# Patient Record
Sex: FEMALE | Race: ASIAN | NOT HISPANIC OR LATINO | Employment: FULL TIME | RURAL
[De-identification: names, ages, dates, MRNs, and addresses within clinical notes are randomized per-mention and may not be internally consistent; named-entity substitution may affect disease eponyms.]

---

## 2021-04-07 ENCOUNTER — HOSPITAL ENCOUNTER (EMERGENCY)
Facility: HOSPITAL | Age: 46
Discharge: HOME | End: 2021-04-07

## 2021-04-07 VITALS
HEIGHT: 64 IN | BODY MASS INDEX: 29.88 KG/M2 | SYSTOLIC BLOOD PRESSURE: 134 MMHG | OXYGEN SATURATION: 100 % | RESPIRATION RATE: 14 BRPM | DIASTOLIC BLOOD PRESSURE: 85 MMHG | HEART RATE: 91 BPM | WEIGHT: 175 LBS | TEMPERATURE: 98 F
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
## 2021-04-07 DIAGNOSIS — S06.0X0A CONCUSSION WITHOUT LOSS OF CONSCIOUSNESS, INITIAL ENCOUNTER: Primary | ICD-10-CM

## 2021-04-07 LAB — POC URINE HCG: NEGATIVE

## 2021-04-07 PROCEDURE — 99284 EMERGENCY DEPT VISIT MOD MDM: CPT | Mod: 25

## 2021-04-07 PROCEDURE — 99282 EMERGENCY DEPT VISIT SF MDM: CPT | Mod: ,,, | Performed by: NURSE PRACTITIONER

## 2021-04-07 PROCEDURE — 99282 PR EMERGENCY DEPT VISIT,LEVEL II: ICD-10-PCS | Mod: ,,, | Performed by: NURSE PRACTITIONER

## 2024-08-14 ENCOUNTER — OFFICE VISIT (OUTPATIENT)
Dept: FAMILY MEDICINE | Facility: CLINIC | Age: 49
End: 2024-08-14

## 2024-08-14 VITALS
OXYGEN SATURATION: 99 % | RESPIRATION RATE: 18 BRPM | BODY MASS INDEX: 32.44 KG/M2 | TEMPERATURE: 98 F | HEART RATE: 77 BPM | SYSTOLIC BLOOD PRESSURE: 110 MMHG | DIASTOLIC BLOOD PRESSURE: 74 MMHG | WEIGHT: 190 LBS | HEIGHT: 64 IN

## 2024-08-14 DIAGNOSIS — M65.4 DE QUERVAIN'S TENOSYNOVITIS, RIGHT: Primary | ICD-10-CM

## 2024-08-14 RX ORDER — MELOXICAM 7.5 MG/1
7.5 TABLET ORAL DAILY
Qty: 15 TABLET | Refills: 0 | Status: SHIPPED | OUTPATIENT
Start: 2024-08-14 | End: 2024-08-29

## 2024-08-14 NOTE — PROGRESS NOTES
Jarrett Gilbert MD   [address], [place] [address]  Phone: [phone]     Subjective     Name: Lavonne Owens   YOB: 1975 (48 y.o.)  MRN: [id number]  Visit Date: 8/14/2024   Chief Complaint: Arm Pain (Room 5 c/o right wrist pain for 4 weeks )        HISTORY OF PRESENT ILLNESS:  48 year apparently healthy female presented in the clinic with complaints of pain in the right wrist since last 4 weeks. The patient does not recall any history of falls or trauma to the hand or wrist or lifting of heavy objects. It started insidiously and has progressively worsened since these past few weeks. She denies fever, joint pain, skin rashes or joint swelling in other parts of the body.She has tried over the counter analgesics like Advil and Tylenol for few days but without much relief. 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 This is the first time she is experiencing such type of pain which has affected her ability to work in the salon. Apart from the right wrist pain she does not voices any concerns at this point of time.    Patient was strongly advised to get a screening mammogram, pap smear for cervical cancer screening and screening colonoscopy which she refused at this time due to financial constraints. She is advised to follow up with state health department and community health clinics for assistance.            PAST MEDICAL HISTORY:  Significant Diagnoses - Patient  has no past medical history on file.  Medications - Patient is not on any long-term medications.   Allergies - Patient has No Known Allergies.  Surgeries - Patient  has no past surgical history on file.  Family History - Patient family history is not on file.      SOCIAL HISTORY:  Tobacco - Patient  reports that she has never smoked. She does not have any smokeless tobacco history on file.   Alcohol - Patient  reports no history of alcohol use.   Recreational Drugs - Patient  reports no history of drug use.       Review  "of Systems   Constitutional:  Negative for fatigue and fever.   HENT:  Negative for sore throat.    Respiratory:  Negative for cough and shortness of breath.    Cardiovascular:  Negative for chest pain.   Musculoskeletal:  Positive for arthralgias. Negative for back pain, joint swelling and joint deformity.   Psychiatric/Behavioral:  Negative for agitation and depressed mood.           History reviewed. No pertinent past medical history.     Review of patient's allergies indicates:  No Known Allergies     History reviewed. No pertinent surgical history.     History reviewed. No pertinent family history.    Current Outpatient Medications   Medication Instructions    meloxicam (MOBIC) 7.5 mg, Oral, Daily        Objective     /74 (BP Location: Left arm, Patient Position: Sitting, BP Method: Medium (Automatic))   Pulse 77   Temp 98.2 °F (36.8 °C) (Oral)   Resp 18   Ht 5' 4" (1.626 m)   Wt 86.2 kg (190 lb)   SpO2 99%   BMI 32.61 kg/m²     Physical Exam  Constitutional:       Appearance: Normal appearance.   Eyes:      Conjunctiva/sclera: Conjunctivae normal.   Cardiovascular:      Rate and Rhythm: Normal rate and regular rhythm.   Pulmonary:      Effort: Pulmonary effort is normal.      Breath sounds: Normal breath sounds.   Musculoskeletal:         General: Tenderness present. No swelling, deformity or signs of injury.      Right wrist: Tenderness and snuff box tenderness present. No deformity or effusion.      Left wrist: Normal.        Arms:    Skin:     General: Skin is warm.   Neurological:      Mental Status: She is alert and oriented to person, place, and time.   Psychiatric:         Behavior: Behavior normal.          All recently obtained labs have been reviewed and discussed in detail with the patient.   Assessment     1. De Quervain's tenosynovitis, right         Plan     Problem List Items Addressed This Visit          Orthopedic    De Quervain's tenosynovitis, right - Primary     The patient has " had persistent pain along the thumb side of the wrist for the last 4 weeks with mild improvement with over-the-counter analgesics. No joint pain or swelling elsewhere in the body    Wrist pain is increased during movement. 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    Physical examination demonstrates tenderness in the radial side of the wrist. Pain intensity increases during radial deviation of the wrist. Diagnosis of De Quervain tenosynovitis is confirmed by a positive Finkelstein test.    The patient is advised for conservative treatments. A thumb spica splint is provided for support,  Meloxicam 7.5mg is given once daily for pain and inflammation and ice application at the painful site is suggested.  She is advised to modify her activity to reduce stress on her wrist, especially during work.    Patient is asked to follow up if symptoms do not resolve or continue to worsen.         Relevant Medications    meloxicam (MOBIC) 7.5 MG tablet         All orders:  Orders Placed This Encounter    meloxicam (MOBIC) 7.5 MG tablet          Follow up in about 3 weeks (around 9/4/2024), or if symptoms worsen or fail to improve.    Jarrett Gilbert MD    [employer]

## 2024-08-15 NOTE — ASSESSMENT & PLAN NOTE
The patient has had persistent pain along the thumb side of the wrist for the last 4 weeks with mild improvement with over-the-counter analgesics. She denies fall or trauma to the wrist and does not complain of pain or swelling elsewhere in the body.    Wrist pain is increased during movement. 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    Physical examination demonstrates tenderness in the radial side of the wrist. Pain intensity increases during radial deviation of the wrist. Diagnosis of De Quervain tenosynovitis is confirmed by a positive Finkelstein test.    The patient is advised for conservative treatments. A thumb spica splint is provided for support,  Meloxicam 7.5mg is given once daily for pain and inflammation and ice application at the painful site is suggested.  She is advised to modify her activity to reduce stress on her wrist, especially during work.    Patient is asked to follow up if symptoms do not resolve or continue to worsen.